# Patient Record
Sex: FEMALE | Race: WHITE | NOT HISPANIC OR LATINO
[De-identification: names, ages, dates, MRNs, and addresses within clinical notes are randomized per-mention and may not be internally consistent; named-entity substitution may affect disease eponyms.]

---

## 2021-04-14 DIAGNOSIS — Z23 ENCOUNTER FOR IMMUNIZATION: ICD-10-CM

## 2023-03-31 ENCOUNTER — TRANSCRIBE ORDERS (OUTPATIENT)
Dept: SCHEDULING | Age: 48
End: 2023-03-31

## 2023-03-31 DIAGNOSIS — E78.2 MIXED HYPERLIPIDEMIA: Primary | ICD-10-CM

## 2023-04-28 ENCOUNTER — HOSPITAL ENCOUNTER (OUTPATIENT)
Dept: RADIOLOGY | Facility: CLINIC | Age: 48
Discharge: HOME | End: 2023-04-28
Attending: INTERNAL MEDICINE

## 2023-04-28 DIAGNOSIS — E78.2 MIXED HYPERLIPIDEMIA: ICD-10-CM

## 2023-04-28 PROCEDURE — 75571 CT HRT W/O DYE W/CA TEST: CPT

## 2025-06-09 ENCOUNTER — HOSPITAL ENCOUNTER (EMERGENCY)
Facility: HOSPITAL | Age: 50
Discharge: HOME | End: 2025-06-09
Attending: STUDENT IN AN ORGANIZED HEALTH CARE EDUCATION/TRAINING PROGRAM | Admitting: STUDENT IN AN ORGANIZED HEALTH CARE EDUCATION/TRAINING PROGRAM
Payer: COMMERCIAL

## 2025-06-09 ENCOUNTER — APPOINTMENT (EMERGENCY)
Dept: RADIOLOGY | Facility: HOSPITAL | Age: 50
End: 2025-06-09
Payer: COMMERCIAL

## 2025-06-09 VITALS
HEART RATE: 90 BPM | DIASTOLIC BLOOD PRESSURE: 62 MMHG | HEIGHT: 67 IN | OXYGEN SATURATION: 96 % | WEIGHT: 168 LBS | TEMPERATURE: 98 F | RESPIRATION RATE: 17 BRPM | BODY MASS INDEX: 26.37 KG/M2 | SYSTOLIC BLOOD PRESSURE: 120 MMHG

## 2025-06-09 DIAGNOSIS — R31.29 MICROSCOPIC HEMATURIA: Primary | ICD-10-CM

## 2025-06-09 DIAGNOSIS — R19.7 DIARRHEA, UNSPECIFIED TYPE: ICD-10-CM

## 2025-06-09 DIAGNOSIS — K56.1 INTUSSUSCEPTION (CMS/HCC): ICD-10-CM

## 2025-06-09 LAB
ALBUMIN SERPL-MCNC: 4.9 G/DL (ref 3.5–5.7)
ALP SERPL-CCNC: 51 IU/L (ref 34–125)
ALT SERPL-CCNC: 20 IU/L (ref 7–52)
ANION GAP SERPL CALC-SCNC: 8 MEQ/L (ref 3–15)
AST SERPL-CCNC: 17 IU/L (ref 13–39)
BACTERIA URNS QL MICRO: ABNORMAL /HPF
BASOPHILS # BLD: 0.01 K/UL (ref 0.01–0.1)
BASOPHILS NFR BLD: 0.1 %
BILIRUB SERPL-MCNC: 1 MG/DL (ref 0.3–1.2)
BILIRUB UR QL STRIP.AUTO: NEGATIVE MG/DL
BUN SERPL-MCNC: 18 MG/DL (ref 7–25)
CALCIUM SERPL-MCNC: 9.9 MG/DL (ref 8.6–10.3)
CHLORIDE SERPL-SCNC: 103 MEQ/L (ref 98–107)
CLARITY UR REFRACT.AUTO: CLEAR
CO2 SERPL-SCNC: 27 MEQ/L (ref 21–31)
COLOR UR AUTO: YELLOW
CREAT SERPL-MCNC: 0.7 MG/DL (ref 0.6–1.2)
DIFFERENTIAL METHOD BLD: NORMAL
EGFRCR SERPLBLD CKD-EPI 2021: >60 ML/MIN/1.73M*2
EOSINOPHIL # BLD: 0.09 K/UL (ref 0.04–0.36)
EOSINOPHIL NFR BLD: 1.1 %
ERYTHROCYTE [DISTWIDTH] IN BLOOD BY AUTOMATED COUNT: 12.5 % (ref 11.7–14.4)
GLUCOSE SERPL-MCNC: 100 MG/DL (ref 70–99)
GLUCOSE UR STRIP.AUTO-MCNC: NEGATIVE MG/DL
HCG UR QL: NEGATIVE
HCT VFR BLD AUTO: 42.2 % (ref 35–45)
HGB BLD-MCNC: 14.5 G/DL (ref 11.8–15.7)
HGB UR QL STRIP.AUTO: 3
HYALINE CASTS #/AREA URNS LPF: ABNORMAL /LPF
IMM GRANULOCYTES # BLD AUTO: 0.02 K/UL (ref 0–0.08)
IMM GRANULOCYTES NFR BLD AUTO: 0.2 %
KETONES UR STRIP.AUTO-MCNC: 2 MG/DL
LEUKOCYTE ESTERASE UR QL STRIP.AUTO: NEGATIVE
LIPASE SERPL-CCNC: 24 U/L (ref 11–82)
LYMPHOCYTES # BLD: 1.26 K/UL (ref 1.2–3.5)
LYMPHOCYTES NFR BLD: 15.5 %
MCH RBC QN AUTO: 30.4 PG (ref 28–33.2)
MCHC RBC AUTO-ENTMCNC: 34.4 G/DL (ref 32.2–35.5)
MCV RBC AUTO: 88.5 FL (ref 83–98)
MONOCYTES # BLD: 0.52 K/UL (ref 0.28–0.8)
MONOCYTES NFR BLD: 6.4 %
MUCOUS THREADS URNS QL MICRO: 1 /LPF
NEUTROPHILS # BLD: 6.21 K/UL (ref 1.7–7)
NEUTS SEG NFR BLD: 76.7 %
NITRITE UR QL STRIP.AUTO: NEGATIVE
NRBC BLD-RTO: 0 %
PH UR STRIP.AUTO: 6.5 [PH]
PLATELET # BLD AUTO: 237 K/UL (ref 150–369)
PMV BLD AUTO: 9.8 FL (ref 9.4–12.3)
POTASSIUM SERPL-SCNC: 4.2 MEQ/L (ref 3.5–5.1)
PROT SERPL-MCNC: 8.1 G/DL (ref 6–8.2)
PROT UR QL STRIP.AUTO: ABNORMAL
RBC # BLD AUTO: 4.77 M/UL (ref 3.93–5.22)
RBC #/AREA URNS HPF: ABNORMAL /HPF
SODIUM SERPL-SCNC: 138 MEQ/L (ref 136–145)
SP GR UR REFRACT.AUTO: 1.02
SQUAMOUS URNS QL MICRO: ABNORMAL /HPF
UROBILINOGEN UR STRIP-ACNC: 0.2 EU/DL
WBC # BLD AUTO: 8.11 K/UL (ref 3.8–10.5)
WBC #/AREA URNS HPF: ABNORMAL /HPF

## 2025-06-09 PROCEDURE — 63700000 HC SELF-ADMINISTRABLE DRUG

## 2025-06-09 PROCEDURE — 3E0337Z INTRODUCTION OF ELECTROLYTIC AND WATER BALANCE SUBSTANCE INTO PERIPHERAL VEIN, PERCUTANEOUS APPROACH: ICD-10-PCS | Performed by: STUDENT IN AN ORGANIZED HEALTH CARE EDUCATION/TRAINING PROGRAM

## 2025-06-09 PROCEDURE — 96375 TX/PRO/DX INJ NEW DRUG ADDON: CPT

## 2025-06-09 PROCEDURE — 36415 COLL VENOUS BLD VENIPUNCTURE: CPT

## 2025-06-09 PROCEDURE — 96374 THER/PROPH/DIAG INJ IV PUSH: CPT | Mod: 59

## 2025-06-09 PROCEDURE — 83690 ASSAY OF LIPASE: CPT

## 2025-06-09 PROCEDURE — 85025 COMPLETE CBC W/AUTO DIFF WBC: CPT | Performed by: STUDENT IN AN ORGANIZED HEALTH CARE EDUCATION/TRAINING PROGRAM

## 2025-06-09 PROCEDURE — 200200 PR NO CHARGE: Performed by: SURGERY

## 2025-06-09 PROCEDURE — 96376 TX/PRO/DX INJ SAME DRUG ADON: CPT

## 2025-06-09 PROCEDURE — 3E033GC INTRODUCTION OF OTHER THERAPEUTIC SUBSTANCE INTO PERIPHERAL VEIN, PERCUTANEOUS APPROACH: ICD-10-PCS | Performed by: STUDENT IN AN ORGANIZED HEALTH CARE EDUCATION/TRAINING PROGRAM

## 2025-06-09 PROCEDURE — 99284 EMERGENCY DEPT VISIT MOD MDM: CPT | Mod: 25

## 2025-06-09 PROCEDURE — 84703 CHORIONIC GONADOTROPIN ASSAY: CPT | Performed by: STUDENT IN AN ORGANIZED HEALTH CARE EDUCATION/TRAINING PROGRAM

## 2025-06-09 PROCEDURE — 80053 COMPREHEN METABOLIC PANEL: CPT | Performed by: STUDENT IN AN ORGANIZED HEALTH CARE EDUCATION/TRAINING PROGRAM

## 2025-06-09 PROCEDURE — 85025 COMPLETE CBC W/AUTO DIFF WBC: CPT

## 2025-06-09 PROCEDURE — 63600105 HC IODINE BASED CONTRAST: Mod: JZ

## 2025-06-09 PROCEDURE — 25800000 HC PHARMACY IV SOLUTIONS

## 2025-06-09 PROCEDURE — 63600000 HC DRUGS/DETAIL CODE: Mod: JZ,TB

## 2025-06-09 PROCEDURE — 81001 URINALYSIS AUTO W/SCOPE: CPT | Performed by: STUDENT IN AN ORGANIZED HEALTH CARE EDUCATION/TRAINING PROGRAM

## 2025-06-09 PROCEDURE — 74177 CT ABD & PELVIS W/CONTRAST: CPT

## 2025-06-09 PROCEDURE — 96361 HYDRATE IV INFUSION ADD-ON: CPT

## 2025-06-09 RX ORDER — DIPHENHYDRAMINE HCL 50 MG/ML
50 VIAL (ML) INJECTION ONCE
Status: COMPLETED | OUTPATIENT
Start: 2025-06-09 | End: 2025-06-09

## 2025-06-09 RX ORDER — ONDANSETRON 4 MG/1
4 TABLET, ORALLY DISINTEGRATING ORAL EVERY 8 HOURS PRN
Qty: 21 TABLET | Refills: 0 | Status: SHIPPED | OUTPATIENT
Start: 2025-06-09 | End: 2025-06-16

## 2025-06-09 RX ORDER — ONDANSETRON HYDROCHLORIDE 2 MG/ML
4 INJECTION, SOLUTION INTRAVENOUS ONCE
Status: COMPLETED | OUTPATIENT
Start: 2025-06-09 | End: 2025-06-09

## 2025-06-09 RX ORDER — ACETAMINOPHEN 325 MG/1
975 TABLET ORAL ONCE
Status: COMPLETED | OUTPATIENT
Start: 2025-06-09 | End: 2025-06-09

## 2025-06-09 RX ORDER — IOPAMIDOL 755 MG/ML
100 INJECTION, SOLUTION INTRAVASCULAR
Status: COMPLETED | OUTPATIENT
Start: 2025-06-09 | End: 2025-06-09

## 2025-06-09 RX ORDER — MORPHINE SULFATE 4 MG/ML
4 INJECTION, SOLUTION INTRAMUSCULAR; INTRAVENOUS ONCE
Status: DISCONTINUED | OUTPATIENT
Start: 2025-06-09 | End: 2025-06-09 | Stop reason: HOSPADM

## 2025-06-09 RX ADMIN — ONDANSETRON 4 MG: 2 INJECTION INTRAMUSCULAR; INTRAVENOUS at 19:41

## 2025-06-09 RX ADMIN — ACETAMINOPHEN 975 MG: 325 TABLET ORAL at 21:32

## 2025-06-09 RX ADMIN — SODIUM CHLORIDE 1000 ML: 9 INJECTION, SOLUTION INTRAVENOUS at 11:39

## 2025-06-09 RX ADMIN — SODIUM CHLORIDE 1000 ML: 9 INJECTION, SOLUTION INTRAVENOUS at 21:34

## 2025-06-09 RX ADMIN — METHYLPREDNISOLONE SODIUM SUCCINATE 40 MG: 40 INJECTION, POWDER, FOR SOLUTION INTRAMUSCULAR; INTRAVENOUS at 12:59

## 2025-06-09 RX ADMIN — DIPHENHYDRAMINE HYDROCHLORIDE 50 MG: 50 INJECTION INTRAMUSCULAR; INTRAVENOUS at 12:59

## 2025-06-09 RX ADMIN — ONDANSETRON 4 MG: 2 INJECTION INTRAMUSCULAR; INTRAVENOUS at 11:39

## 2025-06-09 RX ADMIN — IOPAMIDOL 100 ML: 755 INJECTION, SOLUTION INTRAVENOUS at 15:07

## 2025-06-09 ASSESSMENT — ENCOUNTER SYMPTOMS
NAUSEA: 1
FREQUENCY: 0
RHINORRHEA: 0
DYSURIA: 0
ABDOMINAL PAIN: 1
COUGH: 0
VOMITING: 1
HEMATURIA: 0
CONSTIPATION: 0
BLOOD IN STOOL: 0
SORE THROAT: 0
SHORTNESS OF BREATH: 0
CHILLS: 0
FEVER: 0
DIARRHEA: 1

## 2025-06-09 NOTE — ED PROVIDER NOTES
Emergency Medicine Note  HPI   HISTORY OF PRESENT ILLNESS     49-year-old female with past medical history of hypertension, zepbound use presenting for evaluation of lower abdominal pain, nausea, vomiting, diarrhea, chills without fever which started last week and then resolved.  Initially thought it was something viral however her symptoms returned last night.  Denies fevers.  Has not taken anything for pain.  Thought stool was maybe a little bit darker last week however notes no bloody stools or hematemesis.  Also denies urinary symptoms, URI symptoms, chest pain, or shortness of breath.  Saw her primary care doctor who sent her to the emergency department to rule out appendicitis.          Patient History   PAST HISTORY     Reviewed from Nursing Triage:       No past medical history on file.    No past surgical history on file.    No family history on file.           Review of Systems   REVIEW OF SYSTEMS     Review of Systems   Constitutional:  Negative for chills and fever.   HENT:  Negative for congestion, rhinorrhea and sore throat.    Respiratory:  Negative for cough and shortness of breath.    Cardiovascular:  Negative for chest pain.   Gastrointestinal:  Positive for abdominal pain, diarrhea, nausea and vomiting. Negative for blood in stool and constipation.   Genitourinary:  Negative for dysuria, frequency, hematuria and urgency.         VITALS     ED Vitals      Date/Time Temp Pulse Resp BP SpO2 [facility]   06/09/25 2031 36.7 °C (98 °F) 90 17 120/62 96 % SP   06/09/25 1716 -- 88 18 120/66 98 % SP   06/09/25 1434 -- 73 17 131/60 98 % SP   06/09/25 1214 -- 68 18 130/61 100 % SAP   06/09/25 1042 36 °C (96.8 °F) 73 16 132/76 100 % CW          Pulse Ox %: 100 % (06/09/25 1114)  Pulse Ox Interpretation: Normal (06/09/25 1114)           Physical Exam   PHYSICAL EXAM     Physical Exam  Constitutional:       General: She is not in acute distress.     Appearance: She is not ill-appearing, toxic-appearing or diaphoretic.    HENT:      Head: Normocephalic and atraumatic.   Cardiovascular:      Rate and Rhythm: Normal rate and regular rhythm.      Heart sounds: No murmur heard.     No friction rub. No gallop.   Pulmonary:      Effort: Pulmonary effort is normal. No respiratory distress.      Breath sounds: No wheezing, rhonchi or rales.   Abdominal:      General: There is no distension.      Palpations: Abdomen is soft.      Tenderness: There is generalized abdominal tenderness. There is no guarding or rebound. Negative signs include Tena's sign.   Musculoskeletal:      Right lower leg: No edema.      Left lower leg: No edema.   Skin:     General: Skin is warm and dry.   Neurological:      General: No focal deficit present.      Mental Status: She is alert and oriented to person, place, and time.   Psychiatric:         Mood and Affect: Mood normal.         Behavior: Behavior normal.           PROCEDURES     Procedures     DATA     Results       Procedure Component Value Units Date/Time    Lipase [[id number]]  (Normal) Collected: 06/09/25 1050    Specimen: Blood, Venous Updated: 06/09/25 1248     Lipase 24 U/L     Urinalysis with Reflex Culture [[id number]]  (Abnormal) Collected: 06/09/25 1139    Specimen: Urine, Clean Catch Updated: 06/09/25 1208    Narrative:      The following orders were created for panel order Urinalysis with Reflex Culture.  Procedure                               Abnormality         Status                     ---------                               -----------         ------                     UA Reflex to Culture (Ma...[[id number]]  Abnormal            Final result               UA Microscopic[[id number]]               Abnormal            Final result                 Please view results for these tests on the individual orders.    UA Microscopic [[id number]]  (Abnormal) Collected: 06/09/25 1139    Specimen: Urine, Clean Catch Updated: 06/09/25 1208     RBC, Urine Too Numerous To Count /HPF      WBC, Urine 0 TO 3  /HPF      Squamous Epithelial Rare /hpf      Hyaline Cast None Seen /lpf      Bacteria, Urine None Seen /HPF      Mucus +1 /LPF     UA Reflex to Culture (Macroscopic) [[id number]]  (Abnormal) Collected: 06/09/25 1139    Specimen: Urine, Clean Catch Updated: 06/09/25 1205     Color, Urine Yellow     Clarity, Urine Clear     Specific Gravity, Urine 1.024     pH, Urine 6.5     Leukocyte Esterase Negative     Comment: Results can be falsely negative due to high specific gravity, some antibiotics, glucose >3 g/dl, or WBC other than neutrophils.        Nitrite, Urine Negative     Protein, Urine Trace     Comment: Trace False Positive Protein can be seen with alkaline or highly buffered urines or urine with high specific gravity. Suggest clinical correlation.        Glucose, Urine Negative mg/dL      Ketones, Urine +2 mg/dL      Comment: Free sulfhydryl drugs such as Mesna, Capoten, and Acetylcysteine (Mucomyst) may cause false positive ketonuria.        Urobilinogen, Urine 0.2 EU/dL      Bilirubin, Urine Negative mg/dL      Blood, Urine +3     Comment: The sensitivity of the occult blood test is equivalent to approximately 4 intact RBC/HPF.       Comprehensive metabolic panel [[id number]]  (Abnormal) Collected: 06/09/25 1050    Specimen: Blood, Venous Updated: 06/09/25 1124     Sodium 138 mEQ/L      Potassium 4.2 mEQ/L      Comment: Results obtained on plasma. Plasma Potassium values may be up to 0.4 mEQ/L less than serum values. The differences may be greater for patients with high platelet or white cell counts.        Chloride 103 mEQ/L      CO2 27 mEQ/L      BUN 18 mg/dL      Creatinine 0.7 mg/dL      Glucose 100 mg/dL      Calcium 9.9 mg/dL      AST (SGOT) 17 IU/L      ALT (SGPT) 20 IU/L      Alkaline Phosphatase 51 IU/L      Total Protein 8.1 g/dL      Comment: Test performed on plasma which typically contains approximately 0.4 g/dL more protein than serum.        Albumin 4.9 g/dL      Bilirubin, Total 1.0 mg/dL       eGFR >60.0 mL/min/1.73m*2      Comment: Calculation based on the Chronic Kidney Disease Epidemiology Collaboration (CKD-EPI) equation refit without adjustment for race.        Anion Gap 8 mEQ/L     BhCG, Serum, Qual (if menstruating female and no urine) [[id number]]  (Normal) Collected: 06/09/25 1050    Specimen: Blood, Venous Updated: 06/09/25 1120     Preg Test, Serum Negative    CBC and differential [[id number]] Collected: 06/09/25 1050    Specimen: Blood, Venous Updated: 06/09/25 1058     WBC 8.11 K/uL      RBC 4.77 M/uL      Hemoglobin 14.5 g/dL      Hematocrit 42.2 %      MCV 88.5 fL      MCH 30.4 pg      MCHC 34.4 g/dL      RDW 12.5 %      Platelets 237 K/uL      MPV 9.8 fL      Differential Type Auto     nRBC 0.0 %      Immature Granulocytes 0.2 %      Neutrophils 76.7 %      Lymphocytes 15.5 %      Monocytes 6.4 %      Eosinophils 1.1 %      Basophils 0.1 %      Immature Granulocytes, Absolute 0.02 K/uL      Neutrophils, Absolute 6.21 K/uL      Lymphocytes, Absolute 1.26 K/uL      Monocytes, Absolute 0.52 K/uL      Eosinophils, Absolute 0.09 K/uL      Basophils, Absolute 0.01 K/uL             Imaging Results              CT ABDOMEN PELVIS WITH IV CONTRAST (Final result)  Result time 06/09/25 16:40:42      Final result                   Impression:    IMPRESSION:  1.  Appendix is not definitively seen.  No acute inflammatory process seen in  the right lower quadrant near the base of the cecum.  2.  Jejunojejunal intussusception in the left upper quadrant without findings of  bowel obstruction.  3.  Calcification along the left lower posterior pararenal fascia with  surrounding fat stranding and free fluid, possibly related to an area of  inflammatory fat necrosis but nonspecific.  4.  IUD in place.  Probable dominant follicle or corpus luteum in the right  ovary.  5.  Cholelithiasis.                   Narrative:    EXAM: CT ABDOMEN PELVIS W IV CONTRAST    CLINICAL HISTORY: RLQ abdominal pain (Age >=  14y)  nausea vomiting diarrhea, lower abdominal pain, RLQ tenderness, r/o appendicitis      COMPARISON: None    TECHNIQUE: CT abdomen and pelvis with contrast was performed with the patient in  the supine position. Images reconstructed/reformatted in the axial, coronal and  sagittal planes. Oral contrast was not administered. CT DOSE:  One or more dose  reduction techniques (e.g. automated exposure control, adjustment of the mA  and/or kV according to patient size, use of iterative reconstruction technique)  utilized for this examination. .    CONTRAST: 100mL of iopamidoL (ISOVUE-370) 370 mg iodine /mL (76 %) injection 100  mL    COMMENT:    LOWER THORAX: Subsegmental linear atelectasis. Normal heart size. No pericardial  effusion.    LIVER: Normal size and configuration.  Geographic low-attenuation along the  falciform ligament likely related to focal geographic steatosis. No suspicious  mass.    BILIARY TREE: No intrahepatic or extrahepatic bile duct dilation.    GALLBLADDER: Gallstones without CT findings of acute cholecystitis.    PANCREAS: Within normal limits.    SPLEEN: Within normal limits..    ADRENAL GLANDS: Within normal limits.    KIDNEYS, URETERS: Nonobstructing 0.4 cm left renal stone.  No hydronephrosis. No  suspicious renal mass.    BOWEL: No findings of bowel obstruction.  Jejunojejunal intussusception noted in  the left upper quadrant. Appendix is not definitively visualized. No acute  inflammatory process seen in the right lower quadrant.    PERITONEUM/RETROPERITONEUM:  Calcification measuring 0.8 cm along the left lower  posterior pararenal fascia with surrounding fat stranding and free fluid    LYMPH NODES: No abdominal or pelvic lymphadenopathy.    REPRODUCTIVE ORGANS: Normal size anteverted uterus with IUD in place.  Probable  dominant follicle or corpus luteum measuring up to 1.5 cm in the right ovary.  No suspicious adnexal mass seen.    BLADDER: Within normal limits.    VESSELS: Scattered  aortic atheromatous calcification without aneurysm.    BONES: No suspicious osseous lesion.    SOFT TISSUES: Small foci of gas in the subcutaneous tissues of the lower  abdominal wall motion be related to medication injection sites.                                        No orders to display       Scoring tools                                  ED Course & MDM   MDM / ED COURSE / CLINICAL IMPRESSION / DISPO     Medical Decision Making  49-year-old female with past medical history of hypertension, zepbound use presenting for evaluation of lower abdominal pain, nausea, vomiting, diarrhea, chills without fever which started last week and then resolved before returning yesterday.  On exam patient appears well and in no acute distress with stable vital signs.  There is generalized abdominal tenderness without guarding, rebound.  Negative Tena sign.  Labs are unremarkable thus far.  Lipase pending.  DDx considered but not limited to colitis, appendicitis, gastroenteritis, IBD, IBS.  Will check CT scan  abdomen pelvis with IV contrast to further evaluate.    Problems Addressed:  Diarrhea, unspecified type: acute illness or injury  Intussusception (CMS/HCC): acute illness or injury  Microscopic hematuria: acute illness or injury    Amount and/or Complexity of Data Reviewed  Labs: ordered.  Radiology: ordered. Decision-making details documented in ED Course.    Risk  OTC drugs.  Prescription drug management.        ED Course as of 06/10/25 1559   Mon Jun 09, 2025   1125 Labs unremarkable [CM]   1424 Updated pt with results, CT pending [CM]   1647 CT ABDOMEN PELVIS WITH IV CONTRAST  IMPRESSION:  1.  Appendix is not definitively seen.  No acute inflammatory process seen in  the right lower quadrant near the base of the cecum.  2.  Jejunojejunal intussusception in the left upper quadrant without findings of  bowel obstruction.  3.  Calcification along the left lower posterior pararenal fascia with  surrounding fat stranding  and free fluid, possibly related to an area of  inflammatory fat necrosis but nonspecific.  4.  IUD in place.  Probable dominant follicle or corpus luteum in the right  ovary.  5.  Cholelithiasis. [CM]   1648 Page to surgery [CM]   2057 Updated pt, awaiting surg recs.  Tylenol and additional fluids given for headache [CM]   2101 D/w surg, will be back down to speak with pt, pt will be discharged with f/u with Dr. Mccall [CM]      ED Course User Index  [CM] Jonas Pritchett PA C     Clinical Impression      Microscopic hematuria   Intussusception (CMS/HCC)   Diarrhea, unspecified type     _________________       ED Disposition   Discharge                       Jonas Pritchett PA C  06/10/25 7939

## 2025-06-09 NOTE — PROGRESS NOTES
"Nat Barnes   [id number]    Your doctor has referred you for a   that requires the injection of an iodinated contrast material into your bloodstream. This iodinated contrast material, sometimes referred to as \"x-ray dye\" allows for better interpretation of the x-ray films or CT images and results in a more accurate interpretation of the examination.     Without the use of iodinated contrast (x-ray dye), the examination may be less informative and result in a suboptimal examination, and possibly a delay in diagnosis and, if needed, treatment.     The iodinated contrast material is given through a small needle or catheter placed into a vein, usually on the inside of the elbow, on the back of hand, or in a vein in the foot or lower leg.    The most common, though still rare, potential reaction to an intravenous contrast injection is an allergic-like reaction. Most allergic-like reactions are mild, though a small subset of people can have more severe reactions. Mild reactions include mild / scattered hives, itching, scratchy throat, sneezing and nasal congestion. More severe reactions include facial swelling, severe difficulty breathing, wheezing and anaphylactic shock. Those with a prior history allergic-like reaction to the same class of contrast media (such as CT contrast or MRI contrast) are at the highest risk for an allergic reaction.     If you believe you had an allergic reaction to contrast in the past, please let our staff know. We can determine if this increases your risk for a future reaction and provide steps to decrease the risk. This may delay your examination, but it decreases the risk of having a new and possibly more severe reaction to the contrast injection.    People with a history of prior allergic reactions to other substances (such as unrelated medications and food) and patients with a history of asthma have slightly increased risk for an allergic reaction from contrast material when " compared with the general population, but do not require to be pretreated prior to a contrast injection.    You should notify the physician, nurse or technologist if you have ever had any of these conditions or if you have any questions.

## 2025-06-09 NOTE — ED ATTESTATION NOTE
I have personally seen and examined Nat Barnes.  I was involved in the care and medical decision making for this patient.    I reviewed and agree with physician assistant / nurse practitioner’s assessment and plan of care; any exceptions are documented below.      My focused history, examination, assessment and plan of care of Nat Barnes is as follows:    Brief History:  HPI    49yoF PMH HTN and obesity, on Zepbound,   Here today with N/V/D with lower abd pain since last night   Had similar sxs last week that resolved  Sent in pcp due to concern for apendicitis  Denies fevers, endorses chills, endorses diarrhea, denies any urinary changes    Focused Physical Exam:  Physical Exam  Constitutional:       Appearance: She is well-developed.   Cardiovascular:      Rate and Rhythm: Normal rate and regular rhythm.      Heart sounds: Normal heart sounds. No murmur heard.     No gallop.   Pulmonary:      Effort: Pulmonary effort is normal.      Breath sounds: Normal breath sounds.   Abdominal:      General: Abdomen is flat. Bowel sounds are normal.      Palpations: Abdomen is soft.      Tenderness: There is abdominal tenderness in the right lower quadrant, suprapubic area and left lower quadrant.   Skin:     General: Skin is warm and dry.   Neurological:      Mental Status: She is alert.         Generalized lower abd pain    Assessment / Plan:    Patient is a 49-year-old female here today with generalized lower abdominal pain sent in from primary care doctor's office due to concern for appendicitis.  CT abdomen pelvis pending.  Urine notable for microscopic hematuria.  Patient signed out to Dr. Petersen at change of shift.    Medical Decision Making  Amount and/or Complexity of Data Reviewed  Labs: ordered.  Radiology: ordered.    Risk  Prescription drug management.        I was physically present for the key/critical portions of the following procedures:  Karmen Caballero MD  06/09/25  9542

## 2025-06-09 NOTE — CONSULTS
"General Surgery Consult    Subjective     Nat Barnes is a 49 y.o. female         Sent in by PCP to rule out appendicitis. 1 week ago N/V/diarrhea. Came back last night. Intussuception/fat necrosis     Medical History: No past medical history on file.    Surgical History: No past surgical history on file.    Social History:   Social History     Social History Narrative    Not on file       Family History: No family history on file.    Allergies: Augmentin [amoxicillin-pot clavulanate], Iodinated contrast media, and Iodine    Home Medications:   morphine injection 4 mg  4 mg intravenous Once       Current Medications:  Current Facility-Administered Medications   Medication Dose Route Frequency Provider Last Rate Last Admin    morphine injection 4 mg  4 mg intravenous Once Jonas Pritchett PA C         No current outpatient medications on file.       Review of Systems  {Ros - Complete:84555}    Objective     Physicial Exam  Visit Vitals  /66   Pulse 88   Temp (!) 36 °C (96.8 °F)   Resp 18   Ht 1.702 m (5' 7\")   Wt 76.2 kg (168 lb)   SpO2 98%   BMI 26.31 kg/m²       General appearance: {general exam:63294::\"alert\",\"appears stated age\",\"cooperative\"}  Head: {head exam:16469::\"normocephalic, without obvious abnormality\",\"atraumatic\"}  Eyes: {eye exam:201::\"conjunctivae/corneas clear. PERRL, EOM's intact. Fundi benign.\"}  Ears: {ear exam:5207::\"normal TM's and external ear canals both ears\"}  Nose: {nose exam:78579::\"Nares normal. Septum midline. Mucosa normal. No drainage or sinus tenderness.\"}  Throat: {throat exam:98698::\"lips, mucosa, and tongue normal; teeth and gums normal\"}  Neck: {neck exam:87109::\"no adenopathy\",\"no carotid bruit\",\"no JVD\",\"supple, symmetrical, trachea midline\",\"thyroid not enlarged, symmetric, no tenderness/mass/nodules\"}  Back: {back exam:801::\"symmetric, no curvature. ROM normal. No CVA tenderness.\"}  Lungs: {lung exam:62855::\"clear to auscultation bilaterally\"}  Chest wall: " "{chest exam:32981::\"no tenderness\"}  Heart: {heart exam:02958::\"regular rate and rhythm, S1, S2 normal, no murmur, click, rub or gallop\"}  Abdomen: {abdominal exam:14946::\"soft, non-tender; bowel sounds normal; no masses, no organomegaly\"}  Genitalia: {Genitalia:37141}  Rectal: {rectal exam:93909::\"normal tone, no masses or tenderness\"}  Extremities: {extremity exam:5109::\"extremities normal, warm and well-perfused; no cyanosis, clubbing, or edema\"}  Pulses: {pulse exam:30857::\"2+ and symmetric\"}  Skin: {skin exam:95842::\"Skin color, texture, turgor normal. No rashes or lesions\"}  Lymph nodes: {lymph node exam:95556::\"Cervical, supraclavicular, and axillary nodes normal.\"}  Neurologic: {neuro exam:88412::\"Grossly normal\"}  ***    Labs  {LABS REVIEWED SCT:23606}    Imaging  {Imaging reviewed last 24H:68181}    Assessment     ***        Plan     ***    Lidia Lim MD  "

## 2025-06-10 ENCOUNTER — TELEPHONE (OUTPATIENT)
Dept: BARIATRICS | Facility: CLINIC | Age: 50
End: 2025-06-10
Payer: COMMERCIAL

## 2025-06-10 PROBLEM — K56.1 INTUSSUSCEPTION (CMS/HCC): Status: ACTIVE | Noted: 2025-06-10

## 2025-06-10 NOTE — CONSULTS
General Surgery Consult    Subjective     Nat St Kumar is a 49 y.o. female with PMH HTN, PSH  who presents to the ED with intermittent abdominal pain, nausea, vomiting, chills since last week. She reports that the pain began last Tuesday and was accompanied by nausea, vomiting, hot and cold chills, and diarrhea. The pain was predominantly on the right side at the time however she felt better when she laid on her right side. She reports she had crampy pain that improved after bowel movements. She denies blood in her stool. She suspected a GI bug, and her symptoms resolved on Thursday. She felt well through  night, when the abdominal pain, diarrhea, nausea, and dry heaving returned around 1am. She spoke to her PCP, who instructed her to present to the ED for evaluation. She denies symptoms since arriving in the ED. She reports last BM this morning, last gas last night.     In the ED, she is afebrile, VSS. Labs show WBC 8.11, Hgb 14.5,  , K+ 4.2, Cr 0.7. CT scan shows jejuno-jejunal intussusception in the LUQ without obstruction as well as a 8mm calcification in the left posterior abdomen with surrounding fat stranding. On exam, she is soft, nontender, and nondistended.    Medical History: No past medical history on file.    Surgical History: No past surgical history on file.    Social History:   Social History     Social History Narrative    Not on file       Family History: No family history on file.    Allergies: Augmentin [amoxicillin-pot clavulanate], Iodinated contrast media, and Iodine    Home Medications:      Current Medications:  No current facility-administered medications for this encounter.     Current Outpatient Medications   Medication Sig Dispense Refill    ondansetron ODT (ZOFRAN-ODT) 4 mg disintegrating tablet Take 1 tablet (4 mg total) by mouth every 8 (eight) hours as needed for nausea or vomiting for up to 7 days. 21 tablet 0       Objective     Physicial Exam  Visit  "Vitals  /62   Pulse 90   Temp 36.7 °C (98 °F) (Oral)   Resp 17   Ht 1.702 m (5' 7\")   Wt 76.2 kg (168 lb)   SpO2 96%   BMI 26.31 kg/m²       General appearance: alert, appears stated age and cooperative  HEENT: normocephalic, no JVD, trachea midline, atraumatic  Lungs: No increased work of breathing.   Chest wall: no tenderness  Heart: Regular rate and rhythm.   Abdomen: soft, non-tender, non-distended   Extremities: extremities warm and well-perfused.  Skin: Skin color, texture, turgor normal.  Neurologic: Grossly normal. Moves all four extremities spontaneously.         Labs  Results from last 7 days   Lab Units 06/09/25  1050   WBC K/uL 8.11   HEMOGLOBIN g/dL 14.5   HEMATOCRIT % 42.2   PLATELETS K/uL 237     Results from last 7 days   Lab Units 06/09/25  1050   SODIUM mEQ/L 138   POTASSIUM mEQ/L 4.2   CHLORIDE mEQ/L 103   CO2 mEQ/L 27   BUN mg/dL 18   CREATININE mg/dL 0.7   CALCIUM mg/dL 9.9   ALBUMIN g/dL 4.9   BILIRUBIN TOTAL mg/dL 1.0   ALK PHOS IU/L 51   ALT IU/L 20   AST IU/L 17   GLUCOSE mg/dL 100*             Imaging  CT ABDOMEN PELVIS WITH IV CONTRAST  Result Date: 6/9/2025  EXAM: CT ABDOMEN PELVIS W IV CONTRAST CLINICAL HISTORY: RLQ abdominal pain (Age >= 14y) nausea vomiting diarrhea, lower abdominal pain, RLQ tenderness, r/o appendicitis COMPARISON: None TECHNIQUE: CT abdomen and pelvis with contrast was performed with the patient in the supine position. Images reconstructed/reformatted in the axial, coronal and sagittal planes. Oral contrast was not administered. CT DOSE:  One or more dose reduction techniques (e.g. automated exposure control, adjustment of the mA and/or kV according to patient size, use of iterative reconstruction technique) utilized for this examination. . CONTRAST: 100mL of iopamidoL (ISOVUE-370) 370 mg iodine /mL (76 %) injection 100 mL COMMENT: LOWER THORAX: Subsegmental linear atelectasis. Normal heart size. No pericardial effusion. LIVER: Normal size and configuration.  " Geographic low-attenuation along the falciform ligament likely related to focal geographic steatosis. No suspicious mass. BILIARY TREE: No intrahepatic or extrahepatic bile duct dilation. GALLBLADDER: Gallstones without CT findings of acute cholecystitis. PANCREAS: Within normal limits. SPLEEN: Within normal limits.. ADRENAL GLANDS: Within normal limits. KIDNEYS, URETERS: Nonobstructing 0.4 cm left renal stone.  No hydronephrosis. No suspicious renal mass. BOWEL: No findings of bowel obstruction.  Jejunojejunal intussusception noted in the left upper quadrant. Appendix is not definitively visualized. No acute inflammatory process seen in the right lower quadrant. PERITONEUM/RETROPERITONEUM:  Calcification measuring 0.8 cm along the left lower posterior pararenal fascia with surrounding fat stranding and free fluid LYMPH NODES: No abdominal or pelvic lymphadenopathy. REPRODUCTIVE ORGANS: Normal size anteverted uterus with IUD in place.  Probable dominant follicle or corpus luteum measuring up to 1.5 cm in the right ovary. No suspicious adnexal mass seen. BLADDER: Within normal limits. VESSELS: Scattered aortic atheromatous calcification without aneurysm. BONES: No suspicious osseous lesion. SOFT TISSUES: Small foci of gas in the subcutaneous tissues of the lower abdominal wall motion be related to medication injection sites.     IMPRESSION: 1.  Appendix is not definitively seen.  No acute inflammatory process seen in the right lower quadrant near the base of the cecum. 2.  Jejunojejunal intussusception in the left upper quadrant without findings of bowel obstruction. 3.  Calcification along the left lower posterior pararenal fascia with surrounding fat stranding and free fluid, possibly related to an area of inflammatory fat necrosis but nonspecific. 4.  IUD in place.  Probable dominant follicle or corpus luteum in the right ovary. 5.  Cholelithiasis.       Assessment     48 yo F presenting with one week of  intermittent nausea, vomiting, diarrhea, abdominal pain, found to have jejuno-jejunal intussusception on CT scan. General surgery is consulted for management recommendations     Plan     - Suspect symptoms are due to intermittent jejunal intussusception, which may have resolved since CT scan given complete resolution of symptoms   - No indication for surgical intervention given no obstructive or infectious symptoms, resolution of pain and diarrhea   - Recommend outpatient follow up with general surgery as detailed in discharge instructions   - Return precautions   - Dispo per ED     Fernanda Goyal MD  General Surgery PGY-2  [facility]

## 2025-06-10 NOTE — DISCHARGE INSTRUCTIONS
[place] [facility] Discharge Instructions     You were seen in the [facility] ED for abdominal pain, nausea, vomiting, and diarrhea over the course of the past week. You had a CT scan which showed a jejunojejunal intussusception in the left upper quadrant of your abdomen. It is thought that intermittent intussuscepting, or your small bowel getting tucked in on itself, is the cause of your intermittent abdominal pain. Please call the office the morning after discharge to schedule an appointment to be seen by one of our minimally invasive surgeons, Dr. Mccall or Dr. Landry, to be evaluated for this issue.     Follow up appointment:   Please call the office for an appointment at [phone].    If problems arise after discharge, including but not limited to fever (101 or greater), vomiting, unusual abdominal pain, call the office immediately.    Dr. Cuca Mccall, Dr. Modesta Landry   [address]  [place], [address]  [phone]    Take Tylenol 1000 mg every 8 hours and Motrin 600 mg every 6 hours for the next 7 days as needed for pain.  Be careful not to take more than 4 g of Tylenol in a 24-hour period as this may cause liver damage.  Take zofran as needed for nausea and vomiting.  Return to the emergency department with any new, worsening, or other concerning symptoms.

## 2025-06-12 NOTE — PROGRESS NOTES
Patient ID: Nat Barnes                              : 1975  MRN: [id number]                                            Visit Date: 2025  Encounter Provider: Modesta Landry  Referring Provider: Dagoberto Zavala MD    Subjective   Chief Complaint: Consult    Nat Barnes is a 49 y.o. old female presenting today for follow up form her recent ER visit.  She was in the ER on  for abdominal pain as well as nausea vomiting and diarrhea.  The week prior she had had similar symptoms which had resolved but then recurred again on .  Initially she thought that this was a virus but when the symptoms occurred again she became concerned that it was something else.  Of note she is on Zepbound which she has been on since beginning  and has not recently changed doses.  She typically takes the drug on .  She presented to the ER where she was afebrile and hemodynamically normal.  Her labs at the time were unremarkable.  She underwent a CT scan which I reviewed personally which showed an area of small bowel intussusception in the proximal jejunum without obstruction.  Her symptoms improved while she was in the ER and she was ultimately discharged after a p.o. challenge.  She states that today she still has some ongoing discomfort but denies mauricio pain.  She does not feel that her appetite is normal although she has not had any nausea and vomiting.  Her diarrhea has also resolved.     CT ABDOMEN PELVIS WITH IV CONTRAST 2025     IMPRESSION:  1.  Appendix is not definitively seen.  No acute inflammatory process seen in  the right lower quadrant near the base of the cecum.  2.  Jejunojejunal intussusception in the left upper quadrant without findings of  bowel obstruction.  3.  Calcification along the left lower posterior pararenal fascia with  surrounding fat stranding and free fluid, possibly related to an area of  inflammatory fat necrosis but nonspecific.  4.  IUD in  "place.  Probable dominant follicle or corpus luteum in the right  ovary.  5.  Cholelithiasis.     Medications:   Current Outpatient Medications:     cetirizine (ZyrTEC) 10 mg tablet, daily., Disp: , Rfl:     diphenhydrAMINE (BENADRYL) 50 mg capsule, Take 1 capsule (50 mg total) by mouth once for 1 dose. Take 1 tab 1 hr before exam (with last dose of prednisone)., Disp: 1 capsule, Rfl: 0    fluticasone propionate (FLONASE ALLERGY RELIEF) 50 mcg/actuation nasal spray, , Disp: , Rfl:     losartan (COZAAR) 100 mg tablet, Take 100 mg by mouth daily., Disp: , Rfl:     predniSONE (DELTASONE) 50 mg tablet, Take 1 tablet (50 mg total) by mouth every 6 (six) hours for 3 doses. Take 1 tab 13 hrs, 1 tab 7 hrs, and 1 tab 1 hr before exam., Disp: 3 tablet, Rfl: 0    ZEPBOUND 2.5 mg/0.5 mL subcutaneous PEN injector, 2.5 mg Subcutaneous once weekly; Duration: 30 days, Disp: , Rfl:     ondansetron ODT (ZOFRAN-ODT) 4 mg disintegrating tablet, Take 1 tablet (4 mg total) by mouth every 8 (eight) hours as needed for nausea or vomiting for up to 7 days. (Patient not taking: Reported on 2025), Disp: 21 tablet, Rfl: 0    Past Medical History:  has no past medical history on file.  Past Surgical History:  has a past surgical history that includes  section.  Social History:   Social History     Tobacco Use    Smoking status: Never    Smokeless tobacco: Never   Substance Use Topics    Alcohol use: Not Currently    Drug use: Never      Family History: family history is not on file.   Allergies: is allergic to augmentin [amoxicillin-pot clavulanate], iodinated contrast media, and iodine.     Review of Systems   Gastrointestinal:  Positive for abdominal pain.   All other systems reviewed and are negative.    The following have been reviewed and updated as appropriate in this visit:   Problems         Objective   Vitals: Visit Vitals  /70   Pulse 92   Ht 1.702 m (5' 7\")   Wt 74.8 kg (165 lb)   BMI 25.84 kg/m²     Physical " Exam  Vitals reviewed.   HENT:      Head: Normocephalic and atraumatic.   Cardiovascular:      Rate and Rhythm: Normal rate.   Pulmonary:      Effort: Pulmonary effort is normal.   Abdominal:      Comments: Soft, nondistended, mildly tender to deep palpation in the bilateral upper quadrants and left lower quadrant without guarding or rebound   Skin:     General: Skin is warm and dry.   Neurological:      Mental Status: She is alert.              Assessment/Plan   Problem List Items Addressed This Visit          Digestive    Intussusception (CMS/HCC) - Primary    Relevant Orders    CT ABDOMEN PELVIS WITH IV CONTRAST     49-year-old female who presented to the ER with nausea vomiting and abdominal pain with associated diarrhea and was found to have intussusception on her CT scan.  I discussed with Nat and her  that most of the time small bowel intussusception is transient and an incidental finding on CT that is most commonly related to another inflammatory process such as a viral gastroenteritis.  Given that her symptoms have improved but not resolved I suspect this is the case for her as well.  We did discuss that sometimes intussusception can be from an anatomic lead point such as a small bowel lipoma or other process that may require surgical intervention but that incidental intussusception is more likely.  For now we discussed taking a watchful waiting approach.  If by Monday she feels that she is back to normal then we will forego any additional imaging at this time.  She is not planning on taking any additional doses of the Zepbound.  If she is still having discomfort or other GI symptoms on Monday then we will repeat her CT with oral contrast to see if there is a persistent area of intussusception.  If she has worsening pain or recurrent nausea and vomiting then she will return to the ER and we will plan for diagnostic laparoscopy.  No follow-ups on file.     This document was generated using speech  recognition software. Please excuse any typographical errors.  Modesta Landry MD    I spent 30 minutes on this date of service performing the following activities: obtaining history, performing examination, entering orders, documenting, preparing for visit, obtaining / reviewing records, providing counseling and education, independently reviewing study/studies, and communicating results.

## 2025-06-13 ENCOUNTER — OFFICE VISIT (OUTPATIENT)
Dept: BARIATRICS | Facility: CLINIC | Age: 50
End: 2025-06-13
Payer: COMMERCIAL

## 2025-06-13 VITALS
DIASTOLIC BLOOD PRESSURE: 70 MMHG | WEIGHT: 165 LBS | HEIGHT: 67 IN | SYSTOLIC BLOOD PRESSURE: 124 MMHG | HEART RATE: 92 BPM | BODY MASS INDEX: 25.9 KG/M2

## 2025-06-13 DIAGNOSIS — K56.1 INTUSSUSCEPTION (CMS/HCC): Primary | ICD-10-CM

## 2025-06-13 PROCEDURE — 3008F BODY MASS INDEX DOCD: CPT | Performed by: SURGERY

## 2025-06-13 PROCEDURE — 99203 OFFICE O/P NEW LOW 30 MIN: CPT | Performed by: SURGERY

## 2025-06-13 RX ORDER — PREDNISONE 50 MG/1
50 TABLET ORAL EVERY 6 HOURS
Qty: 3 TABLET | Refills: 0 | Status: SHIPPED | OUTPATIENT
Start: 2025-06-13 | End: 2025-06-14

## 2025-06-13 RX ORDER — CETIRIZINE HYDROCHLORIDE 10 MG/1
TABLET ORAL
COMMUNITY

## 2025-06-13 RX ORDER — TIRZEPATIDE 2.5 MG/.5ML
INJECTION, SOLUTION SUBCUTANEOUS
COMMUNITY
Start: 2025-04-24

## 2025-06-13 RX ORDER — DIPHENHYDRAMINE HCL 50 MG
50 CAPSULE ORAL ONCE
Qty: 1 CAPSULE | Refills: 0 | Status: SHIPPED | OUTPATIENT
Start: 2025-06-13 | End: 2025-06-13

## 2025-06-13 RX ORDER — FLUTICASONE PROPIONATE 50 MCG
SPRAY, SUSPENSION (ML) NASAL
COMMUNITY

## 2025-06-13 RX ORDER — LOSARTAN POTASSIUM 100 MG/1
100 TABLET ORAL DAILY
COMMUNITY
Start: 2025-01-20

## 2025-06-13 ASSESSMENT — ENCOUNTER SYMPTOMS: ABDOMINAL PAIN: 1

## 2025-06-13 NOTE — LETTER
2025     Dagoberto Zavala MD  [address] [facility] [address]    Patient: Nat Barnes  YOB: 1975  Date of Visit: 2025      Dear Dr. Zavala:    Thank you for referring Nat Barnes to me for evaluation. Below are my notes for this consultation.    If you have questions, please do not hesitate to call me. I look forward to following your patient along with you.         Sincerely,        Modesta Landry MD        CC: Modesta Yuan MD  2025  4:37 PM  Sign when Signing Visit  Patient ID: Nat Barnes                              : 1975  MRN: [id number]                                            Visit Date: 2025  Encounter Provider: Modesta Landry  Referring Provider: Dagoberto Zavala MD    Subjective  Chief Complaint: Consult    Nat Barnes is a 49 y.o. old female presenting today for follow up form her recent ER visit.  She was in the ER on  for abdominal pain as well as nausea vomiting and diarrhea.  The week prior she had had similar symptoms which had resolved but then recurred again on .  Initially she thought that this was a virus but when the symptoms occurred again she became concerned that it was something else.  Of note she is on Zepbound which she has been on since beginning of April and has not recently changed doses.  She typically takes the drug on .  She presented to the ER where she was afebrile and hemodynamically normal.  Her labs at the time were unremarkable.  She underwent a CT scan which I reviewed personally which showed an area of small bowel intussusception in the proximal jejunum without obstruction.  Her symptoms improved while she was in the ER and she was ultimately discharged after a p.o. challenge.  She states that today she still has some ongoing discomfort but denies mauricio pain.  She does not feel that her appetite is normal although she has not had  any nausea and vomiting.  Her diarrhea has also resolved.     CT ABDOMEN PELVIS WITH IV CONTRAST 2025     IMPRESSION:  1.  Appendix is not definitively seen.  No acute inflammatory process seen in  the right lower quadrant near the base of the cecum.  2.  Jejunojejunal intussusception in the left upper quadrant without findings of  bowel obstruction.  3.  Calcification along the left lower posterior pararenal fascia with  surrounding fat stranding and free fluid, possibly related to an area of  inflammatory fat necrosis but nonspecific.  4.  IUD in place.  Probable dominant follicle or corpus luteum in the right  ovary.  5.  Cholelithiasis.     Medications:   Current Outpatient Medications:   •  cetirizine (ZyrTEC) 10 mg tablet, daily., Disp: , Rfl:   •  diphenhydrAMINE (BENADRYL) 50 mg capsule, Take 1 capsule (50 mg total) by mouth once for 1 dose. Take 1 tab 1 hr before exam (with last dose of prednisone)., Disp: 1 capsule, Rfl: 0  •  fluticasone propionate (FLONASE ALLERGY RELIEF) 50 mcg/actuation nasal spray, , Disp: , Rfl:   •  losartan (COZAAR) 100 mg tablet, Take 100 mg by mouth daily., Disp: , Rfl:   •  predniSONE (DELTASONE) 50 mg tablet, Take 1 tablet (50 mg total) by mouth every 6 (six) hours for 3 doses. Take 1 tab 13 hrs, 1 tab 7 hrs, and 1 tab 1 hr before exam., Disp: 3 tablet, Rfl: 0  •  ZEPBOUND 2.5 mg/0.5 mL subcutaneous PEN injector, 2.5 mg Subcutaneous once weekly; Duration: 30 days, Disp: , Rfl:   •  ondansetron ODT (ZOFRAN-ODT) 4 mg disintegrating tablet, Take 1 tablet (4 mg total) by mouth every 8 (eight) hours as needed for nausea or vomiting for up to 7 days. (Patient not taking: Reported on 2025), Disp: 21 tablet, Rfl: 0    Past Medical History:  has no past medical history on file.  Past Surgical History:  has a past surgical history that includes  section.  Social History:   Social History     Tobacco Use   • Smoking status: Never   • Smokeless tobacco: Never   Substance  "Use Topics   • Alcohol use: Not Currently   • Drug use: Never      Family History: family history is not on file.   Allergies: is allergic to augmentin [amoxicillin-pot clavulanate], iodinated contrast media, and iodine.     Review of Systems   Gastrointestinal:  Positive for abdominal pain.   All other systems reviewed and are negative.    The following have been reviewed and updated as appropriate in this visit:   Problems         Objective  Vitals: Visit Vitals  /70   Pulse 92   Ht 1.702 m (5' 7\")   Wt 74.8 kg (165 lb)   BMI 25.84 kg/m²     Physical Exam  Vitals reviewed.   HENT:      Head: Normocephalic and atraumatic.   Cardiovascular:      Rate and Rhythm: Normal rate.   Pulmonary:      Effort: Pulmonary effort is normal.   Abdominal:      Comments: Soft, nondistended, mildly tender to deep palpation in the bilateral upper quadrants and left lower quadrant without guarding or rebound   Skin:     General: Skin is warm and dry.   Neurological:      Mental Status: She is alert.              Assessment/Plan  Problem List Items Addressed This Visit          Digestive    Intussusception (CMS/HCC) - Primary    Relevant Orders    CT ABDOMEN PELVIS WITH IV CONTRAST     49-year-old female who presented to the ER with nausea vomiting and abdominal pain with associated diarrhea and was found to have intussusception on her CT scan.  I discussed with Nat and her  that most of the time small bowel intussusception is transient and an incidental finding on CT that is most commonly related to another inflammatory process such as a viral gastroenteritis.  Given that her symptoms have improved but not resolved I suspect this is the case for her as well.  We did discuss that sometimes intussusception can be from an anatomic lead point such as a small bowel lipoma or other process that may require surgical intervention but that incidental intussusception is more likely.  For now we discussed taking a watchful " waiting approach.  If by Monday she feels that she is back to normal then we will forego any additional imaging at this time.  She is not planning on taking any additional doses of the Zepbound.  If she is still having discomfort or other GI symptoms on Monday then we will repeat her CT with oral contrast to see if there is a persistent area of intussusception.  If she has worsening pain or recurrent nausea and vomiting then she will return to the ER and we will plan for diagnostic laparoscopy.  No follow-ups on file.     This document was generated using speech recognition software. Please excuse any typographical errors.  Modesta Landry MD    I spent 30 minutes on this date of service performing the following activities: obtaining history, performing examination, entering orders, documenting, preparing for visit, obtaining / reviewing records, providing counseling and education, independently reviewing study/studies, and communicating results.

## 2025-06-20 ENCOUNTER — HOSPITAL ENCOUNTER (OUTPATIENT)
Dept: RADIOLOGY | Facility: HOSPITAL | Age: 50
Discharge: HOME | End: 2025-06-20
Attending: SURGERY
Payer: COMMERCIAL

## 2025-06-20 DIAGNOSIS — K56.1 INTUSSUSCEPTION (CMS/HCC): ICD-10-CM

## 2025-06-20 PROCEDURE — 25500000 HC DRUGS/INCIDENT RAD: Performed by: SURGERY

## 2025-06-20 PROCEDURE — 63600105 HC IODINE BASED CONTRAST: Mod: JZ | Performed by: SURGERY

## 2025-06-20 PROCEDURE — 74177 CT ABD & PELVIS W/CONTRAST: CPT

## 2025-06-20 RX ORDER — IOPAMIDOL 755 MG/ML
100 INJECTION, SOLUTION INTRAVASCULAR
Status: COMPLETED | OUTPATIENT
Start: 2025-06-20 | End: 2025-06-20

## 2025-06-20 RX ADMIN — IOPAMIDOL 100 ML: 755 INJECTION, SOLUTION INTRAVENOUS at 17:53

## 2025-06-20 RX ADMIN — BARIUM SULFATE 900 ML: 20 SUSPENSION ORAL at 17:53

## 2025-06-23 ENCOUNTER — TELEPHONE (OUTPATIENT)
Dept: BARIATRICS | Facility: CLINIC | Age: 50
End: 2025-06-23
Payer: COMMERCIAL

## 2025-06-23 NOTE — TELEPHONE ENCOUNTER
Patient called and asked if she could be given a call once the results from her CT scan come back.